# Patient Record
Sex: FEMALE | Race: WHITE | Employment: UNEMPLOYED | URBAN - METROPOLITAN AREA
[De-identification: names, ages, dates, MRNs, and addresses within clinical notes are randomized per-mention and may not be internally consistent; named-entity substitution may affect disease eponyms.]

---

## 2017-11-10 ENCOUNTER — TRANSFERRED RECORDS (OUTPATIENT)
Dept: HEALTH INFORMATION MANAGEMENT | Facility: CLINIC | Age: 42
End: 2017-11-10

## 2018-06-07 ENCOUNTER — HOSPITAL ENCOUNTER (INPATIENT)
Facility: HOSPITAL | Age: 43
LOS: 4 days | Discharge: NURSING, REHAB OR LONG-TERM CARE | End: 2018-06-11
Attending: FAMILY MEDICINE | Admitting: PSYCHIATRY & NEUROLOGY
Payer: MEDICAID

## 2018-06-07 DIAGNOSIS — F20.9 SCHIZOPHRENIA (H): ICD-10-CM

## 2018-06-07 DIAGNOSIS — F20.0 CHRONIC PARANOID SCHIZOPHRENIA (H): Primary | ICD-10-CM

## 2018-06-07 DIAGNOSIS — E03.9 HYPOTHYROIDISM, UNSPECIFIED TYPE: ICD-10-CM

## 2018-06-07 PROBLEM — F29 PSYCHOSIS (H): Status: ACTIVE | Noted: 2018-06-07

## 2018-06-07 LAB
ALBUMIN SERPL-MCNC: 3.4 G/DL (ref 3.4–5)
ALBUMIN UR-MCNC: NEGATIVE MG/DL
ALP SERPL-CCNC: 86 U/L (ref 40–150)
ALT SERPL W P-5'-P-CCNC: 29 U/L (ref 0–50)
AMPHETAMINES UR QL SCN: NEGATIVE
ANION GAP SERPL CALCULATED.3IONS-SCNC: 7 MMOL/L (ref 3–14)
APAP SERPL-MCNC: <2 MG/L (ref 10–20)
APPEARANCE UR: ABNORMAL
APTT PPP: 28 SEC (ref 24–37)
AST SERPL W P-5'-P-CCNC: 20 U/L (ref 0–45)
BACTERIA #/AREA URNS HPF: ABNORMAL /HPF
BARBITURATES UR QL: NEGATIVE
BASOPHILS # BLD AUTO: 0.1 10E9/L (ref 0–0.2)
BASOPHILS NFR BLD AUTO: 0.5 %
BENZODIAZ UR QL: NEGATIVE
BILIRUB SERPL-MCNC: 0.5 MG/DL (ref 0.2–1.3)
BILIRUB UR QL STRIP: NEGATIVE
BUN SERPL-MCNC: 11 MG/DL (ref 7–30)
CALCIUM SERPL-MCNC: 9.6 MG/DL (ref 8.5–10.1)
CANNABINOIDS UR QL SCN: NEGATIVE
CHLORIDE SERPL-SCNC: 103 MMOL/L (ref 94–109)
CO2 SERPL-SCNC: 27 MMOL/L (ref 20–32)
COCAINE UR QL: NEGATIVE
COLOR UR AUTO: ABNORMAL
CREAT SERPL-MCNC: 0.71 MG/DL (ref 0.52–1.04)
DIFFERENTIAL METHOD BLD: NORMAL
EOSINOPHIL # BLD AUTO: 0.2 10E9/L (ref 0–0.7)
EOSINOPHIL NFR BLD AUTO: 2.6 %
ERYTHROCYTE [DISTWIDTH] IN BLOOD BY AUTOMATED COUNT: 14.6 % (ref 10–15)
ETHANOL SERPL-MCNC: <0.01 G/DL
GFR SERPL CREATININE-BSD FRML MDRD: 90 ML/MIN/1.7M2
GLUCOSE SERPL-MCNC: 132 MG/DL (ref 70–99)
GLUCOSE UR STRIP-MCNC: NEGATIVE MG/DL
HCG UR QL: NEGATIVE
HCT VFR BLD AUTO: 41.1 % (ref 35–47)
HGB BLD-MCNC: 13.6 G/DL (ref 11.7–15.7)
HGB UR QL STRIP: NEGATIVE
IMM GRANULOCYTES # BLD: 0 10E9/L (ref 0–0.4)
IMM GRANULOCYTES NFR BLD: 0.4 %
INR PPP: 1.03 (ref 0.8–1.2)
KETONES UR STRIP-MCNC: NEGATIVE MG/DL
LEUKOCYTE ESTERASE UR QL STRIP: ABNORMAL
LYMPHOCYTES # BLD AUTO: 1.9 10E9/L (ref 0.8–5.3)
LYMPHOCYTES NFR BLD AUTO: 21 %
MCH RBC QN AUTO: 26.5 PG (ref 26.5–33)
MCHC RBC AUTO-ENTMCNC: 33.1 G/DL (ref 31.5–36.5)
MCV RBC AUTO: 80 FL (ref 78–100)
METHADONE UR QL SCN: NEGATIVE
MONOCYTES # BLD AUTO: 0.6 10E9/L (ref 0–1.3)
MONOCYTES NFR BLD AUTO: 6 %
MUCOUS THREADS #/AREA URNS LPF: PRESENT /LPF
NEUTROPHILS # BLD AUTO: 6.4 10E9/L (ref 1.6–8.3)
NEUTROPHILS NFR BLD AUTO: 69.5 %
NITRATE UR QL: NEGATIVE
NRBC # BLD AUTO: 0 10*3/UL
NRBC BLD AUTO-RTO: 0 /100
OPIATES UR QL SCN: NEGATIVE
PCP UR QL SCN: NEGATIVE
PH UR STRIP: 5.5 PH (ref 4.7–8)
PLATELET # BLD AUTO: 224 10E9/L (ref 150–450)
POTASSIUM SERPL-SCNC: 3.3 MMOL/L (ref 3.4–5.3)
PROT SERPL-MCNC: 7.3 G/DL (ref 6.8–8.8)
RBC # BLD AUTO: 5.14 10E12/L (ref 3.8–5.2)
RBC #/AREA URNS AUTO: 1 /HPF (ref 0–2)
SALICYLATES SERPL-MCNC: <2 MG/DL
SODIUM SERPL-SCNC: 137 MMOL/L (ref 133–144)
SOURCE: ABNORMAL
SP GR UR STRIP: 1.01 (ref 1–1.03)
SQUAMOUS #/AREA URNS AUTO: 5 /HPF (ref 0–1)
TSH SERPL DL<=0.005 MIU/L-ACNC: 6.58 MU/L (ref 0.4–4)
UROBILINOGEN UR STRIP-MCNC: NORMAL MG/DL (ref 0–2)
WBC # BLD AUTO: 9.3 10E9/L (ref 4–11)
WBC #/AREA URNS AUTO: 7 /HPF (ref 0–5)

## 2018-06-07 PROCEDURE — 36415 COLL VENOUS BLD VENIPUNCTURE: CPT | Performed by: FAMILY MEDICINE

## 2018-06-07 PROCEDURE — 80307 DRUG TEST PRSMV CHEM ANLYZR: CPT | Performed by: FAMILY MEDICINE

## 2018-06-07 PROCEDURE — 80329 ANALGESICS NON-OPIOID 1 OR 2: CPT | Performed by: FAMILY MEDICINE

## 2018-06-07 PROCEDURE — 99284 EMERGENCY DEPT VISIT MOD MDM: CPT | Mod: Z6 | Performed by: FAMILY MEDICINE

## 2018-06-07 PROCEDURE — 87086 URINE CULTURE/COLONY COUNT: CPT | Performed by: FAMILY MEDICINE

## 2018-06-07 PROCEDURE — 80053 COMPREHEN METABOLIC PANEL: CPT | Performed by: FAMILY MEDICINE

## 2018-06-07 PROCEDURE — 25000132 ZZH RX MED GY IP 250 OP 250 PS 637: Performed by: FAMILY MEDICINE

## 2018-06-07 PROCEDURE — 12400000 ZZH R&B MH

## 2018-06-07 PROCEDURE — 81025 URINE PREGNANCY TEST: CPT | Performed by: FAMILY MEDICINE

## 2018-06-07 PROCEDURE — 99223 1ST HOSP IP/OBS HIGH 75: CPT | Performed by: NURSE PRACTITIONER

## 2018-06-07 PROCEDURE — 84443 ASSAY THYROID STIM HORMONE: CPT | Performed by: FAMILY MEDICINE

## 2018-06-07 PROCEDURE — 80320 DRUG SCREEN QUANTALCOHOLS: CPT | Performed by: FAMILY MEDICINE

## 2018-06-07 PROCEDURE — 85730 THROMBOPLASTIN TIME PARTIAL: CPT | Performed by: FAMILY MEDICINE

## 2018-06-07 PROCEDURE — 25000132 ZZH RX MED GY IP 250 OP 250 PS 637: Performed by: NURSE PRACTITIONER

## 2018-06-07 PROCEDURE — 81001 URINALYSIS AUTO W/SCOPE: CPT | Performed by: FAMILY MEDICINE

## 2018-06-07 PROCEDURE — 99285 EMERGENCY DEPT VISIT HI MDM: CPT

## 2018-06-07 PROCEDURE — 85610 PROTHROMBIN TIME: CPT | Performed by: FAMILY MEDICINE

## 2018-06-07 PROCEDURE — 85025 COMPLETE CBC W/AUTO DIFF WBC: CPT | Performed by: FAMILY MEDICINE

## 2018-06-07 RX ORDER — OLANZAPINE 10 MG/1
10 TABLET ORAL ONCE
Status: COMPLETED | OUTPATIENT
Start: 2018-06-07 | End: 2018-06-07

## 2018-06-07 RX ORDER — RISPERIDONE 1 MG/1
1 TABLET ORAL AT BEDTIME
Status: DISCONTINUED | OUTPATIENT
Start: 2018-06-07 | End: 2018-06-08

## 2018-06-07 RX ORDER — OLANZAPINE 10 MG/1
TABLET ORAL
Status: DISCONTINUED
Start: 2018-06-07 | End: 2018-06-07 | Stop reason: HOSPADM

## 2018-06-07 RX ORDER — ALUMINA, MAGNESIA, AND SIMETHICONE 2400; 2400; 240 MG/30ML; MG/30ML; MG/30ML
30 SUSPENSION ORAL EVERY 4 HOURS PRN
Status: DISCONTINUED | OUTPATIENT
Start: 2018-06-07 | End: 2018-06-11 | Stop reason: HOSPADM

## 2018-06-07 RX ORDER — ACETAMINOPHEN 325 MG/1
650 TABLET ORAL EVERY 4 HOURS PRN
Status: DISCONTINUED | OUTPATIENT
Start: 2018-06-07 | End: 2018-06-11 | Stop reason: HOSPADM

## 2018-06-07 RX ORDER — BISACODYL 10 MG
10 SUPPOSITORY, RECTAL RECTAL DAILY PRN
Status: DISCONTINUED | OUTPATIENT
Start: 2018-06-07 | End: 2018-06-11 | Stop reason: HOSPADM

## 2018-06-07 RX ORDER — LEVOTHYROXINE SODIUM 25 UG/1
50 TABLET ORAL
Status: DISCONTINUED | OUTPATIENT
Start: 2018-06-07 | End: 2018-06-11 | Stop reason: HOSPADM

## 2018-06-07 RX ORDER — LEVOTHYROXINE SODIUM 50 UG/1
50 TABLET ORAL
Status: ON HOLD | COMMUNITY
Start: 2017-02-01 | End: 2018-06-11

## 2018-06-07 RX ORDER — HYDROXYZINE HYDROCHLORIDE 25 MG/1
25-50 TABLET, FILM COATED ORAL EVERY 4 HOURS PRN
Status: DISCONTINUED | OUTPATIENT
Start: 2018-06-07 | End: 2018-06-11 | Stop reason: HOSPADM

## 2018-06-07 RX ADMIN — LEVOTHYROXINE SODIUM 50 MCG: 25 TABLET ORAL at 14:31

## 2018-06-07 RX ADMIN — RISPERIDONE 1 MG: 1 TABLET ORAL at 20:34

## 2018-06-07 RX ADMIN — OLANZAPINE 10 MG: 10 TABLET, FILM COATED ORAL at 02:58

## 2018-06-07 ASSESSMENT — ACTIVITIES OF DAILY LIVING (ADL)
DRESS: SCRUBS (BEHAVIORAL HEALTH);INDEPENDENT
ORAL_HYGIENE: INDEPENDENT
RETIRED_COMMUNICATION: 0-->UNDERSTANDS/COMMUNICATES WITHOUT DIFFICULTY
DRESS: 0-->INDEPENDENT
FALL_HISTORY_WITHIN_LAST_SIX_MONTHS: NO
LAUNDRY: UNABLE TO COMPLETE
TOILETING: 0-->INDEPENDENT
RETIRED_EATING: 0-->INDEPENDENT
AMBULATION: 0-->INDEPENDENT
ORAL_HYGIENE: INDEPENDENT
LAUNDRY: UNABLE TO COMPLETE
BATHING: 0-->INDEPENDENT
DRESS: SCRUBS (BEHAVIORAL HEALTH)
GROOMING: INDEPENDENT
GROOMING: INDEPENDENT
SWALLOWING: 0-->SWALLOWS FOODS/LIQUIDS WITHOUT DIFFICULTY
TRANSFERRING: 0-->INDEPENDENT
COGNITION: 0 - NO COGNITION ISSUES REPORTED

## 2018-06-07 NOTE — ED PROVIDER NOTES
History     Chief Complaint   Patient presents with     Mental Health Problem     HPI  Kaylee Hester is a 42 year old female who presents to the ER by PD who states that she called 911 for sunburn however PD brought her in for a mental health check . Patient denies of self harm or hurting others. Denies drug or alcohol use.  . States that she does not know whats going outside and it effects the halo around her head  And she doesn't understand what goes on outside the halo around her head . Last took her risperdal 1 year ago . Never made an appointment . Patient does hear voices of people chatting. Chatting does not make sense. Voices do not tell her to do anything in particular     Problem List:    There are no active problems to display for this patient.       Past Medical History:    No past medical history on file.    Past Surgical History:    No past surgical history on file.    Family History:    No family history on file.    Social History:  Marital Status:   [2]  Social History   Substance Use Topics     Smoking status: Not on file     Smokeless tobacco: Not on file     Alcohol use Not on file        Medications:      levothyroxine (SYNTHROID/LEVOTHROID) 50 MCG tablet   RisperiDONE (RISPERDAL PO)         Review of Systems   All other systems reviewed and are negative.      Physical Exam   BP: (!) 147/102  Pulse: 101  Temp: 96.7  F (35.9  C)  Resp: 16  SpO2: 97 %      Physical Exam   Constitutional: She is oriented to person, place, and time. She appears well-developed and well-nourished. No distress.   HENT:   Head: Normocephalic and atraumatic.   Eyes: Pupils are equal, round, and reactive to light.   Neck: Normal range of motion. Neck supple.   Pulmonary/Chest: Effort normal and breath sounds normal.   Abdominal: Soft.   Musculoskeletal: Normal range of motion.   Neurological: She is alert and oriented to person, place, and time.   Skin: Skin is warm and dry. She is not diaphoretic.    Psychiatric: She has a normal mood and affect.   Nursing note and vitals reviewed.      ED Course     ED Course     Procedures          Patient arrived to ER with PD for mental health eval after [place] . Patient has schizophrenia and has been off of her medications for the last 1 year. Patient is not suicidal or homicidal but having psychosis. DEC assessment requested . After assessment DEC felt patient appropriate for inpatient admission to reinitiate medication management . Patient admitted to 5th floor following medical clearance in the ER       Results for orders placed or performed during the hospital encounter of 06/07/18   Drug Screen Urine (Range)   Result Value Ref Range    Amphetamine Qual Urine Negative NEG^Negative    Barbiturates Qual Urine Negative NEG^Negative    Benzodiazepine Qual Urine Negative NEG^Negative    Cannabinoids Qual Urine Negative NEG^Negative    Cocaine Qual Urine Negative NEG^Negative    Opiates Qualitative Urine Negative NEG^Negative    Methadone Qual Urine Negative NEG^Negative    PCP Qual Urine Negative NEG^Negative   UA with Microscopic reflex to Culture   Result Value Ref Range    Color Urine Light Yellow     Appearance Urine Slightly Cloudy     Glucose Urine Negative NEG^Negative mg/dL    Bilirubin Urine Negative NEG^Negative    Ketones Urine Negative NEG^Negative mg/dL    Specific Gravity Urine 1.006 1.003 - 1.035    Blood Urine Negative NEG^Negative    pH Urine 5.5 4.7 - 8.0 pH    Protein Albumin Urine Negative NEG^Negative mg/dL    Urobilinogen mg/dL Normal 0.0 - 2.0 mg/dL    Nitrite Urine Negative NEG^Negative    Leukocyte Esterase Urine Moderate (A) NEG^Negative    Source Midstream Urine     WBC Urine 7 (H) 0 - 5 /HPF    RBC Urine 1 0 - 2 /HPF    Bacteria Urine Few (A) NEG^Negative /HPF    Squamous Epithelial /HPF Urine 5 (H) 0 - 1 /HPF    Mucous Urine Present (A) NEG^Negative /LPF   HCG qualitative urine   Result  Value Ref Range    HCG Qual Urine Negative NEG^Negative   Acetaminophen level   Result Value Ref Range    Acetaminophen Level <2 (L) 10 - 20 mg/L   Alcohol ethyl   Result Value Ref Range    Ethanol g/dL <0.01 0.01 g/dL   CBC with platelets differential   Result Value Ref Range    WBC 9.3 4.0 - 11.0 10e9/L    RBC Count 5.14 3.8 - 5.2 10e12/L    Hemoglobin 13.6 11.7 - 15.7 g/dL    Hematocrit 41.1 35.0 - 47.0 %    MCV 80 78 - 100 fl    MCH 26.5 26.5 - 33.0 pg    MCHC 33.1 31.5 - 36.5 g/dL    RDW 14.6 10.0 - 15.0 %    Platelet Count 224 150 - 450 10e9/L    Diff Method Automated Method     % Neutrophils 69.5 %    % Lymphocytes 21.0 %    % Monocytes 6.0 %    % Eosinophils 2.6 %    % Basophils 0.5 %    % Immature Granulocytes 0.4 %    Nucleated RBCs 0 0 /100    Absolute Neutrophil 6.4 1.6 - 8.3 10e9/L    Absolute Lymphocytes 1.9 0.8 - 5.3 10e9/L    Absolute Monocytes 0.6 0.0 - 1.3 10e9/L    Absolute Eosinophils 0.2 0.0 - 0.7 10e9/L    Absolute Basophils 0.1 0.0 - 0.2 10e9/L    Abs Immature Granulocytes 0.0 0 - 0.4 10e9/L    Absolute Nucleated RBC 0.0    Comprehensive metabolic panel   Result Value Ref Range    Sodium 137 133 - 144 mmol/L    Potassium 3.3 (L) 3.4 - 5.3 mmol/L    Chloride 103 94 - 109 mmol/L    Carbon Dioxide 27 20 - 32 mmol/L    Anion Gap 7 3 - 14 mmol/L    Glucose 132 (H) 70 - 99 mg/dL    Urea Nitrogen 11 7 - 30 mg/dL    Creatinine 0.71 0.52 - 1.04 mg/dL    GFR Estimate 90 >60 mL/min/1.7m2    GFR Estimate If Black >90 >60 mL/min/1.7m2    Calcium 9.6 8.5 - 10.1 mg/dL    Bilirubin Total 0.5 0.2 - 1.3 mg/dL    Albumin 3.4 3.4 - 5.0 g/dL    Protein Total 7.3 6.8 - 8.8 g/dL    Alkaline Phosphatase 86 40 - 150 U/L    ALT 29 0 - 50 U/L    AST 20 0 - 45 U/L   INR   Result Value Ref Range    INR 1.03 0.80 - 1.20   Partial thromboplastin time   Result Value Ref Range    PTT 28 24.00 - 37.00 sec   Salicylate level   Result Value Ref Range    Salicylate Level <2 mg/dL   TSH   Result Value Ref Range    TSH 6.58 (H)  0.40 - 4.00 mU/L         No results found for this or any previous visit (from the past 24 hour(s)).    Medications - No data to display    Assessments & Plan (with Medical Decision Making)     I have reviewed the nursing notes.    I have reviewed the findings, diagnosis, plan and need for follow up with the patient.      New Prescriptions    No medications on file       Final diagnoses:   Schizophrenia (H)       6/7/2018   [facility]     Paige Toth MD  06/07/18 0801

## 2018-06-07 NOTE — PLAN OF CARE
Problem: Patient Care Overview  Goal: Team Discussion  Team Plan:   BEHAVIORAL TEAM DISCUSSION    Participants:Amaya Puri NP, Juliet Zayas NP,  Radha Roman NP, Anna Iglesias [facility], Zoraida Urbina [facility], Shirley Rios RN, Temi Ta OT, Mallory MILLS Intern, Radha OT, Mike Rush RN   Progress: None- New admit  Continued Stay Criteria/Rationale: Need to assess and restart medications  Medical/Physical: unknown  Precautions:   Behavioral Orders   Procedures     Code 1 - Restrict to Unit     Routine Programming     As clinically indicated     Status 15     Every 15 minutes.     Plan: Assess  Rationale for change in precautions or plan: n/a

## 2018-06-07 NOTE — ED TRIAGE NOTES
Pt presents with c/o being brought in via [place] PD with c/o being homeless, is living in a tent at the campground in [place] [facility] [place]. Pt denies HI or SI, pt states she has not taken meds in over a year.

## 2018-06-07 NOTE — PLAN OF CARE
Problem: Thought Process Alteration (Adult)  Goal: Improved Thought Process  Patient will have a reality based conversations.  Patient will report a decrease in hallucinations.  Patient will follow treatment teams recommendations.   Outcome: No Change  Pt arrives on unit from the [place] ED. Pt is ambulates without difficulty. She denies SI, HI, depression, anxiety, and hallucinations. It was reported that pt called 911 on herself due to sunburn, pt is then brought into the ED for Mental Health assessment. Pt states she is here due to exposure. Pt does report she does hear voices but not at this time. She states she is tired. Pt is falling asleep during assessment. Pt is compliant with skin assessment. Nurse reviews bathroom policy and patient requests to lay down. Nurse will complete nursing assessment when patient wakes.     ADMISSION NOTE    Reason for admission: Hallucinations  Safety concerns: patient has auditory hallucinations  Risk for or history of violence no known hx of violenc   Full skin assessment: Heels are dry and cracking, no other areas of concern.     Patient arrived on unit from [facility] ED accompanied by Security and ED staff on 6/7/2018  7:27 AM.   Status on arrival: calm, cooperative, walks independently  /80  Pulse 83  Temp 97  F (36.1  C) (Tympanic)  Resp 16  Wt 129.2 kg (284 lb 14.4 oz)  SpO2 98%  Patient given tour of unit and Welcome to  unit papers given to patient, wanding completed, belongings inventoried, and admission assessment completed.   Patient's legal status on arrival is Voluntary. Appropriate legal rights discussed with and copy given to patient. Patient Bill of Rights discussed with and copy given to patient.   Patient denies SI, HI, and thoughts of self harm and contracts for safety while on unit.      Rebecca Mayes  6/7/2018  9:14 AM        Nurse requests patient to sign a release for [facility]. Pt declines. Nurse does contact [facility] and Elena  [place] Nurse updates patient that she was last seen in Nov. 2017 when she was prescribed Risperdal 4 mg BID and Hydroxyzine 25mg TID PRN for anxiety, she was provided 6 months worth of medications. She did schedule an appointment on May 2nd but then called back and canceled it stating she had found care elsewhere. She then called back on May 30th and scheduled an appointment again stating she needed Xanax to relax. She was previously prescribed Prozac, Olanzapine, and Seroquel XL. Also she does have 2 sons she talks with that live in [place] [facility] [place]. Pt.s diagnosis on file are Schizoaffective depressed type and generalized anxiety.     Pt is med compliant. Pt continues to sleep, she will not answer questions asked of her.

## 2018-06-07 NOTE — PLAN OF CARE
Problem: Patient Care Overview  Goal: Discharge Needs Assessment  Attempted to complete assessment on pt along with provider.  Pt stated she was here to get assessed for her sun burn.  She fell asleep repeatedly and then became agitated so assessments were not completed.  Writer called [facility] to see if pt had any services set up but they reported she did not.  Writer tried to get pt sign a CHARLES for her family or [facility] but pt fell asleep again.   intern called [facility] and learned pt already has an appointment set up there for medication management on June 13th with Elena Mustafa and with the therapist tSef Wisdom on June 11th.      Pt does not currently have any medical insurance: pt financial came but will come back tomorrow to help her get set up with insurance.

## 2018-06-07 NOTE — IP AVS SNAPSHOT
[facility]    [address] [place] [id number]    Phone:  [phone]    Fax:  [phone]                                       After Visit Summary   6/7/2018    Kaylee Hester    MRN: [id number]           After Visit Summary Signature Page     I have received my discharge instructions, and my questions have been answered. I have discussed any challenges I see with this plan with the nurse or doctor.    ..........................................................................................................................................  Patient/Patient Representative Signature      ..........................................................................................................................................  Patient Representative Print Name and Relationship to Patient    ..................................................               ................................................  Date                                            Time    ..........................................................................................................................................  Reviewed by Signature/Title    ...................................................              ..............................................  Date                                                            Time

## 2018-06-07 NOTE — H&P
"Psychiatric Eval/H&P  Patient Name: Kaylee Hester   YOB: 1975  Age: 42 year old  [id number]    Primary Physician: No Ref-Primary, Physician   Completed By: Radha Roman NP     CC: \"I'm here because of overexposure\"    Patient is a poor historian and unable to provide much relevant information. Information obtained from brief patient visit and review of available records.    HPI   Kaylee Hester is a 42 year old   female who was brought to the ED by police for mental health assessment after calling 911 in the middle of the night for sunburn. She has been staying in a tent in [facility]. She reportedly receives food from the [employer]. In the ED she reported that she does not know what is going on outside and it affects the halo around her head. History of taking Risperdal, has been off of medications for about a year.     I attempted to meet with Kaylee today in her room, though she initially keeps her back to me and repeatedly falls asleep during questioning. She is unable to provide any meaningful information besides that she came to the hospital for \"overexposure\" and is on the mental health floor \"for monitoring\". Discussed her use of Risperdal and she states that it \"doesn't help\", though then states \"I think I need some Xanax.\" When asked if this was being prescribed to her she reports \"no naren no one will give it to me\". She then starts to become upset and sticks her middle finger up at me and rolls over mumbling something about calling her when some male leaves the unit. When asked to repeat what she had just said she then ignores me.     ED records indicate that she admitted to hearing \"voices of people chatting\", though the chatting did not make any sense. She denied any thoughts of suicidal or homicidal ideation. TSH was noted to be elevated upon admission, has not been taking Synthroid or other medications reportedly. It appears she was most recently " prescribed Risperdal at [facility] in November. Will attempt to restart this today to see if symptoms improve.         [facility]     Past Psychiatric History:   It is unclear whether she has been hospitalized for mental health in the past. Records do show a historical diagnosis of schizophrenia and schizoaffective disorder. She did report seeing Elena Mustafa CNP at [facility] for medications. Past medication trials include Risperdal, Zyprexa, Prozac, Hydroxyzine, and Seroquel as well as likely others.     Social History:   She reportedly is living in a tent somewhere in [place] with her dog. She got  in 2017 after 15 years of marriage. Has 2 sons. Per patient, she had been living in [place] with her  and kids for about 15 years. Her ex- and 2 sons remain in [place] and she communicates with them via internet. Unclear when she moved back to [place], though records show that she grew up in the area and mother currently lives in [place].       Chemical Use History:   She denied any drug use in the ED. Utox and ETOH were both negative.     Family Psychiatric History:   Unknown at this time.        Medical History and ROS  No current outpatient prescriptions on file.     No Known Allergies     No past medical history on file.     No past surgical history on file.      Physical Exam    Constitutional: oriented to person, place, and time.    HENT:   Head: Normocephalic and atraumatic.   Right Ear: External ear normal.   Left Ear: External ear normal.   Nose: Nose normal.   Mouth/Throat: Oropharynx is clear and moist.    Eyes: Conjunctivae and EOM are normal.  Neck: Normal range of motion.  Cardiovascular: Normal rate, regular rhythm  Pulmonary/Chest: Effort normal and breath sounds normal.   Abdominal: Soft. Bowel sounds are normal.    Skin: Dry, intact, no open areas, rashes, moles of concern    Review of Systems:  Unable to complete assessment as she declines to  participate.      MSE/PSYCH  PSYCHIATRIC EXAM  /80  Pulse 83  Temp 97  F (36.1  C) (Tympanic)  Resp 16  Wt 129.2 kg (284 lb 14.4 oz)  SpO2 98%  -Appearance/Behavior:  No apparent distress and Appears stated age, has shaved head    -Attitude:guarded, irritable  -Motor: normal or unremarkable.  -Gait: not observed as patient is in bed  -Abnormal involuntary movements: none noted.  -Mood: irritable and labile.  -Affect: Labile.  -Speech: regular rate and rhythm                -Thought process/associations: Tangential.  -Thought content: difficult to assess as she offers little in conversation  -Perceptual disturbances: Occasional hallucinations., in ED reported hearing voices             -Suicidal/Homicidal Ideation: denies any suicidal or homicidal ideation  -Judgment: Limited.  -Insight: Limited.  *Orientation: person, unable to assess further as she refuses to answer questions  *Memory: questionable  *Attention: limited, easily distracted  *Language: fluent, no aphasias, able to repeat phrases and name objects. Vocab intact.  *Fund of information: appropriate for education  *Cognitive functioning estimate: 0 - independent.     Labs:   Results for orders placed or performed during the hospital encounter of 06/07/18   Drug Screen Urine (Range)   Result Value Ref Range    Amphetamine Qual Urine Negative NEG^Negative    Barbiturates Qual Urine Negative NEG^Negative    Benzodiazepine Qual Urine Negative NEG^Negative    Cannabinoids Qual Urine Negative NEG^Negative    Cocaine Qual Urine Negative NEG^Negative    Opiates Qualitative Urine Negative NEG^Negative    Methadone Qual Urine Negative NEG^Negative    PCP Qual Urine Negative NEG^Negative   UA with Microscopic reflex to Culture   Result Value Ref Range    Color Urine Light Yellow     Appearance Urine Slightly Cloudy     Glucose Urine Negative NEG^Negative mg/dL    Bilirubin Urine Negative NEG^Negative    Ketones Urine Negative NEG^Negative mg/dL    Specific  Gravity Urine 1.006 1.003 - 1.035    Blood Urine Negative NEG^Negative    pH Urine 5.5 4.7 - 8.0 pH    Protein Albumin Urine Negative NEG^Negative mg/dL    Urobilinogen mg/dL Normal 0.0 - 2.0 mg/dL    Nitrite Urine Negative NEG^Negative    Leukocyte Esterase Urine Moderate (A) NEG^Negative    Source Midstream Urine     WBC Urine 7 (H) 0 - 5 /HPF    RBC Urine 1 0 - 2 /HPF    Bacteria Urine Few (A) NEG^Negative /HPF    Squamous Epithelial /HPF Urine 5 (H) 0 - 1 /HPF    Mucous Urine Present (A) NEG^Negative /LPF   HCG qualitative urine   Result Value Ref Range    HCG Qual Urine Negative NEG^Negative   Acetaminophen level   Result Value Ref Range    Acetaminophen Level <2 (L) 10 - 20 mg/L   Alcohol ethyl   Result Value Ref Range    Ethanol g/dL <0.01 0.01 g/dL   CBC with platelets differential   Result Value Ref Range    WBC 9.3 4.0 - 11.0 10e9/L    RBC Count 5.14 3.8 - 5.2 10e12/L    Hemoglobin 13.6 11.7 - 15.7 g/dL    Hematocrit 41.1 35.0 - 47.0 %    MCV 80 78 - 100 fl    MCH 26.5 26.5 - 33.0 pg    MCHC 33.1 31.5 - 36.5 g/dL    RDW 14.6 10.0 - 15.0 %    Platelet Count 224 150 - 450 10e9/L    Diff Method Automated Method     % Neutrophils 69.5 %    % Lymphocytes 21.0 %    % Monocytes 6.0 %    % Eosinophils 2.6 %    % Basophils 0.5 %    % Immature Granulocytes 0.4 %    Nucleated RBCs 0 0 /100    Absolute Neutrophil 6.4 1.6 - 8.3 10e9/L    Absolute Lymphocytes 1.9 0.8 - 5.3 10e9/L    Absolute Monocytes 0.6 0.0 - 1.3 10e9/L    Absolute Eosinophils 0.2 0.0 - 0.7 10e9/L    Absolute Basophils 0.1 0.0 - 0.2 10e9/L    Abs Immature Granulocytes 0.0 0 - 0.4 10e9/L    Absolute Nucleated RBC 0.0    Comprehensive metabolic panel   Result Value Ref Range    Sodium 137 133 - 144 mmol/L    Potassium 3.3 (L) 3.4 - 5.3 mmol/L    Chloride 103 94 - 109 mmol/L    Carbon Dioxide 27 20 - 32 mmol/L    Anion Gap 7 3 - 14 mmol/L    Glucose 132 (H) 70 - 99 mg/dL    Urea Nitrogen 11 7 - 30 mg/dL    Creatinine 0.71 0.52 - 1.04 mg/dL    GFR  "Estimate 90 >60 mL/min/1.7m2    GFR Estimate If Black >90 >60 mL/min/1.7m2    Calcium 9.6 8.5 - 10.1 mg/dL    Bilirubin Total 0.5 0.2 - 1.3 mg/dL    Albumin 3.4 3.4 - 5.0 g/dL    Protein Total 7.3 6.8 - 8.8 g/dL    Alkaline Phosphatase 86 40 - 150 U/L    ALT 29 0 - 50 U/L    AST 20 0 - 45 U/L   INR   Result Value Ref Range    INR 1.03 0.80 - 1.20   Partial thromboplastin time   Result Value Ref Range    PTT 28 24.00 - 37.00 sec   Salicylate level   Result Value Ref Range    Salicylate Level <2 mg/dL   TSH   Result Value Ref Range    TSH 6.58 (H) 0.40 - 4.00 mU/L   Urine Culture Aerobic Bacterial   Result Value Ref Range    Specimen Description Midstream Urine     Culture Micro Culture in progress         Assessment/Impression: This is a 42 year old yo female with a reported history of schizoaffective disorder. She reportedly called 911 last night to report that she had sunburn and was subsequently brought to the ED by police for mental health evaluation. Has been living in a tent in [place]. Has reportedly been off all medications for about a year. I am unable to get any relevant information from her today, she gets upset with questioning and then sticks up her middle finger and rolls over in bed. It does appear that she was most recently on Risperdal 4 mg BID. Will attempt to restart this tonight. She does request Xanax during our brief interaction and reported \"no one will give it to me\". She is currently voluntary though may need to consider petition for commitment if she refuses medications and symptoms don't improve as she is seemingly unable to care for herself at this point or make sound decisions.     Educated regarding medication indications, risks, benefits, side effects, contraindications and possible interactions. Verbally expressed understanding.     DX:  Schizoaffective disorder-depressed type (by history)     Plan:  Admit to Unit: [address] [place]  Attending: Radha Roman CNP  Patient is: Voluntary  Other " routine labs were notable for elevated TSH 6.58  Monitor for target symptoms: improved mood, compliance with care  Provide a safe environment and therapeutic milieu.   Restart Risperdal and Synthroid- has taken Risperdal in the past  May need to consider petition for commitment if refusing medications and no notable improvement in symptoms    Anticipated length of stay: 3-5 days       RITA Araya, CNP

## 2018-06-07 NOTE — IP AVS SNAPSHOT
MRN:[id number]                      After Visit Summary   6/7/2018    Kaylee Hester    MRN: [id number]           Thank you!     Thank you for choosing [facility] for your care. Our goal is always to provide you with excellent care. Hearing back from our patients is one way we can continue to improve our services. Please take a few minutes to complete the written survey that you may receive in the mail after you visit with us. Thank you!        Patient Information     Date Of Birth          1975        Designated Caregiver       Most Recent Value    Caregiver    Will someone help with your care after discharge? no      About your hospital stay     You were admitted on:  June 7, 2018 You last received care in the:  [facility]    You were discharged on:  June 11, 2018       Who to Call     For medical emergencies, please call 911.  For non-urgent questions about your medical care, please call your primary care provider or clinic, None          Attending Provider     Provider Specialty    Paige Toth MD [facility]    Con Redding MD Psychiatry    Petty, Amaya De Leon NP Psychiatry       Primary Care Provider Fax #    Physician No Ref-Primary [phone]      Further instructions from your care team       Behavioral Discharge Planning and Instructions    Summary: Kaylee was admitted to [address] [place] [address] for psychosis    Main Diagnosis:  Schizoaffective disorder, depressed type    Major Treatments, Procedures and Findings: Stabilize with medications, connect with community programs.    Symptoms to Report: feeling more aggressive, increased confusion, losing more sleep, mood getting worse or thoughts of suicide    Lifestyle Adjustment: Take all medications as prescribed, meet with doctor/ medication provider, out patient therapist, , and [facility] worker as scheduled. Abstain from alcohol or any unprescribed drugs.      Psychiatry Follow-up:     [facility]: Elena Mustafa: Wed. June 13th @ 10:40 am  Therapy: Stef Artis: call to reschedule  [address] [place] [address]  [phone]  [phone]    [facility] :  Referral sent for Case Management and [facility] 6/11/18  [facility]  [address] [place] [address]  Phone [phone]   Fax- [phone]    [place], [place]   Phone: [phone]    Resources:   Crisis Intervention: 193.739.1019 or 143-125-2008 (TTY: 408.984.7682).  Call anytime for help.  National Wing on Mental Illness (www.[place].tanya.org): 651.115.1046 or 239-222-9011.  Alcoholics Anonymous (www.alcoholics-anonymous.org): Check your phone book for your local chapter.  Suicide Awareness Voices of Education (SAVE) (www.save.org): 116-633-MNED (2593)  National Suicide Prevention Line (www.mentalhealthmn.org): 044-932-RXUW (2519)  Mental Health Consumer/Survivor Network of [place] (www.mhcsn.net): 520.750.5361 or 104-184-7716  Mental Health Association of [place] (www.mentalhealth.org): 163.154.8567 or 708-493-5570    General Medication Instructions:   See your medication sheet(s) for instructions.   Take all medicines as directed.  Make no changes unless your doctor suggests them.   Go to all your doctor visits.  Be sure to have all your required lab tests. This way, your medicines can be refilled on time.  Do not use any drugs not prescribed by your doctor.  Avoid alcohol.    [facility] Area:  [facility], Crisis stabilization [facility]- [phone]  [facility] Crisis Line: 1-606.691.8460  Advocates For Family Peace: 930-2477  Sexual Assault Program [facility]: 534.417.3082 or 1-875.168.7926  [place] [facility]: 9-282-330-8208 Ext: 279       Calls answered Mon-Fri-8:00 am--4:30 pm    [place]:  Advocates for Family Peace: 1-082-269-8256  [facility] first call for help: 1-264.932.4402  [facility] Crisis Center:  (478) [phone]      [place] Area:  Warm Line:  "5-659-250-9088       Calls answered Tuesday--Saturday 4:00 pm--10:00 pm  [facility] Crisis Line - [phone]  [facility] 254-861-3063    [place] Statewide:  [place] Crisis and Referral Services: 3-925-536-1745  National Suicide Prevention Lifeline: 8-025-993-TALK (7638)   - efm9xbgw- Text \"Life\" to 82641  First Call for Help:   [facility] Helpline- 3-222-RXHX-HELP          Pending Results     No orders found from 2018 to 2018.            Statement of Approval     Ordered          18 1227  I have reviewed and agree with all the recommendations and orders detailed in this document.  EFFECTIVE NOW     Approved and electronically signed by:  Radha Roman NP             Admission Information     Date & Time Provider Department Dept. Phone    2018 Amaya Puri NP [facility] [phone]      Your Vitals Were     Blood Pressure Pulse Temperature Respirations Weight Pulse Oximetry    147/68 84 98  F (36.7  C) (Tympanic) 14 129.9 kg (286 lb 6.4 oz) 97%      MyChart Information     [facility] lets you send messages to your doctor, view your test results, renew your prescriptions, schedule appointments and more. To sign up, go to [email or web address] . Click on \"Log in\" on the left side of the screen, which will take you to the Welcome page. Then click on \"Sign up Now\" on the right side of the page.     You will be asked to enter the access code listed below, as well as some personal information. Please follow the directions to create your username and password.     Your access code is: [email or web address]  Expires: 2018 12:54 PM     Your access code will  in 90 days. If you need help or a new code, please call your [facility] clinic or [phone].        Care EveryWhere ID     This is your Care EveryWhere ID. This could be used by other organizations to access your [facility] medical records  [id number]        Equal Access to Services     ANMOL SHAIKH AH: Triny ybarra " cj Riojas, waakuada luanthonyadaha, qaybta kaelielda dre, tyler maikolin hayaakarel beforrest jessicavinicius lareynakarel miriam. So [facility] [phone].    ATENCIÓN: Si vanessa hager, tiene a johnson disposición servicios gratuitos de asistencia lingüística. Yamil al [phone].    We comply with applicable federal civil rights laws and [place] laws. We do not discriminate on the basis of race, color, national origin, age, disability, sex, sexual orientation, or gender identity.               Review of your medicines      START taking        Dose / Directions    emollient cream        Apply topically every 6 hours as needed for other (dry skin)   Refills:  0         CONTINUE these medicines which may have CHANGED, or have new prescriptions. If we are uncertain of the size of tablets/capsules you have at home, strength may be listed as something that might have changed.        Dose / Directions    levothyroxine 50 MCG tablet   Commonly known as:  SYNTHROID/LEVOTHROID   This may have changed:  when to take this   Used for:  Hypothyroidism, unspecified type        Dose:  50 mcg   Take 1 tablet (50 mcg) by mouth daily   Quantity:  30 tablet   Refills:  0       * risperiDONE 2 MG tablet   Commonly known as:  risperDAL   This may have changed:    - medication strength  - how much to take  - when to take this   Used for:  Chronic paranoid schizophrenia (H)        Dose:  2 mg   Take 1 tablet (2 mg) by mouth At Bedtime   Quantity:  30 tablet   Refills:  0       * risperiDONE 0.5 MG tablet   Commonly known as:  risperDAL   This may have changed:  You were already taking a medication with the same name, and this prescription was added. Make sure you understand how and when to take each.   Used for:  Chronic paranoid schizophrenia (H)        Dose:  0.5 mg   Start taking on:  6/12/2018   Take 1 tablet (0.5 mg) by mouth daily in the morning   Quantity:  30 tablet   Refills:  0       * Notice:  This list has 2 medication(s) that are the same as other  medications prescribed for you. Read the directions carefully, and ask your doctor or other care provider to review them with you.         Where to get your medicines      These medications were sent to [facility] - [place], [place] - [address]  [address] [address]     Phone:  [phone]     levothyroxine 50 MCG tablet    risperiDONE 0.5 MG tablet    risperiDONE 2 MG tablet                Protect others around you: Learn how to safely use, store and throw away your medicines at www.disposemymeds.org.             Medication List: This is a list of all your medications and when to take them. Check marks below indicate your daily home schedule. Keep this list as a reference.      Medications           Morning Afternoon Evening Bedtime As Needed    emollient cream   Apply topically every 6 hours as needed for other (dry skin)   Last time this was given:  6/10/2018 12:38 PM                                levothyroxine 50 MCG tablet   Commonly known as:  SYNTHROID/LEVOTHROID   Take 1 tablet (50 mcg) by mouth daily   Last time this was given:  50 mcg on 6/11/2018  5:48 AM                                * risperiDONE 2 MG tablet   Commonly known as:  risperDAL   Take 1 tablet (2 mg) by mouth At Bedtime   Last time this was given:  0.5 mg on 6/11/2018  8:50 AM                                * risperiDONE 0.5 MG tablet   Commonly known as:  risperDAL   Take 1 tablet (0.5 mg) by mouth daily in the morning   Start taking on:  6/12/2018   Last time this was given:  0.5 mg on 6/11/2018  8:50 AM                                * Notice:  This list has 2 medication(s) that are the same as other medications prescribed for you. Read the directions carefully, and ask your doctor or other care provider to review them with you.

## 2018-06-07 NOTE — PLAN OF CARE
Face to face end of shift report received from Rebecca ANTUNEZ RN. Rounding completed. Patient observed sleeping in bedroom.     Kenyatta Mendoza  6/7/2018  3:40 PM

## 2018-06-07 NOTE — PROGRESS NOTES
06/07/18 0854   Patient Belongings   Did you bring any home meds/supplements to the hospital?  No   Patient Belongings cell phone/electronics;clothing;shoes;tote   Disposition of Belongings Locker  (belongings sent to cubby in patient belongings room)   Belongings Search Yes   Clothing Search Yes   Second Staff Erma   General Info Comment blk flip flops, brown hooded sweatshit, blue megan shirt, blk tank top (feeling Tropical), orange bag, blue carring case, ear buds, white phone , blk phone , I phone 6 box with earbuds in it, manilla envelope with paper work (wet), tall glass vase, roll on deoderant, neutrogena small conditioner, neutrogena bar soap    List items sent to safe: 2 cell phones, 1 blk I Phone 6 with no cracks, 1 blk cell with cracked screen, [place], 1 book of [place] transit tickets, direct express debit mastercard, [place] [employer] card, several mics. Cards, 1 blue razor sent to security  All other belongings put in assigned cubby in belongings room.     I have reviewed my belongings list on admission and verify that it is correct.     Patient signature_______________________________    Second staff witness (if patient unable to sign) ______________________________       I have received all my belongings at discharge.    Patient signature________________________________    Laura   6/7/2018  9:01 AM

## 2018-06-07 NOTE — ED NOTES
TC from [facility] Intake, with update on pt admission status.  Stated has tried x 4 to contact Amaya Puri, on call provider for [facility] with no response.  He will continue to try.  Kristie, Supervisor notified.  Dr. Martinez updated.

## 2018-06-08 LAB
BACTERIA SPEC CULT: NORMAL
SPECIMEN SOURCE: NORMAL

## 2018-06-08 PROCEDURE — 12400000 ZZH R&B MH

## 2018-06-08 PROCEDURE — 25000132 ZZH RX MED GY IP 250 OP 250 PS 637: Performed by: NURSE PRACTITIONER

## 2018-06-08 PROCEDURE — 99232 SBSQ HOSP IP/OBS MODERATE 35: CPT | Performed by: NURSE PRACTITIONER

## 2018-06-08 RX ORDER — HALOPERIDOL 10 MG/1
10 TABLET ORAL EVERY 6 HOURS PRN
Status: DISCONTINUED | OUTPATIENT
Start: 2018-06-08 | End: 2018-06-11 | Stop reason: HOSPADM

## 2018-06-08 RX ORDER — HALOPERIDOL 5 MG/ML
10 INJECTION INTRAMUSCULAR EVERY 6 HOURS PRN
Status: DISCONTINUED | OUTPATIENT
Start: 2018-06-08 | End: 2018-06-11 | Stop reason: HOSPADM

## 2018-06-08 RX ORDER — RISPERIDONE 1 MG/1
2 TABLET ORAL AT BEDTIME
Status: DISCONTINUED | OUTPATIENT
Start: 2018-06-08 | End: 2018-06-11 | Stop reason: HOSPADM

## 2018-06-08 RX ADMIN — HYDROXYZINE HYDROCHLORIDE 50 MG: 25 TABLET ORAL at 08:16

## 2018-06-08 RX ADMIN — HALOPERIDOL 10 MG: 10 TABLET ORAL at 08:29

## 2018-06-08 RX ADMIN — LEVOTHYROXINE SODIUM 50 MCG: 25 TABLET ORAL at 06:20

## 2018-06-08 RX ADMIN — RISPERIDONE 2 MG: 1 TABLET ORAL at 20:07

## 2018-06-08 ASSESSMENT — ACTIVITIES OF DAILY LIVING (ADL)
GROOMING: INDEPENDENT
ORAL_HYGIENE: INDEPENDENT
GROOMING: INDEPENDENT
DRESS: SCRUBS (BEHAVIORAL HEALTH);INDEPENDENT
DRESS: SCRUBS (BEHAVIORAL HEALTH)
LAUNDRY: UNABLE TO COMPLETE
ORAL_HYGIENE: INDEPENDENT

## 2018-06-08 NOTE — PLAN OF CARE
Problem: Patient Care Overview  Goal: Team Discussion  Team Plan:   BEHAVIORAL TEAM DISCUSSION    Participants: Amaya Puri NP, Juliet Zayas NP, Radha Roman NP, Tangela Shafer LSW, Chayo Ness [facility], Mallory Barros SW intern, Kimmie Foster RN, Temi Ta OT, Juliet Mccarthy OT  Progress: none, delusional, moved to MHICU  Continued Stay Criteria/Rationale: delusional, bizarre behaviors, continue to stabilize on medications  Medical/Physical: hypothyroid  Precautions:   Behavioral Orders   Procedures     Code 1 - Restrict to Unit     Routine Programming     As clinically indicated     Status 15     Every 15 minutes.     Plan: possibly file for commitment, continue to stabilize on medications  Rationale for change in precautions or plan: None

## 2018-06-08 NOTE — PROGRESS NOTES
"[facility]  Psychiatric Progress Note      Impression:   This is a 42 year old yo female with a reported history of schizoaffective disorder.    Kaylee is resting in bed when I see her today. She was reportedly moved to the [facility] this morning after exhibiting bizarre behavior. She was yelling, singing songs, and barking like a dog. She was speaking in a different language and then stated \"Carlos A Cervantes hates my tits\". She told nursing staff that she has a chip implanted in the right side of her mouth which is making her ill. She did willingly take PRN Haldol. I spoke with her this afternoon and she seems much clearer. She is able to have a more linear conversation and answers my questions appropriately. She is able to provide more information today. She reports that she was evicted from her apartment on March 30th and has been living \"in the woods and in a tent\" since that time. Moved back to [place] from [place] in 2012. She states that she does not want to live in a tent anymore and has someone in [facility] working on finding her a place to stay. She denies that she is hearing any voices at this time. She states that she stopped taking her medications after she was unable to get in touch with her prescriber. She notes that she would call the office but would have to leave a voice mail. When they went to call her back she notes that her phone often was not working, so she would not receive any calls back. She does report a desire to find stable housing, find a new medication prescriber, and take her medications. Will increase her Risperdal tonight, past dosing had reportedly been 4 mg BID. Will assess over the weekend. If she continues to decompensate or starts to refuse medications consideration may need to be given to filing a petition for commitment.    Educated regarding medication indications, risks, benefits, side effects, contraindications and possible interactions. Verbally expressed " understanding.        DIagnoses:   Schizoaffective disorder-depressed type (by history)      Attestation:  Patient has been seen and evaluated by me,  Radha Roman NP          Interim History:   The patient's care was discussed with the treatment team and chart notes were reviewed.          Medications:     Current Facility-Administered Medications Ordered in Epic   Medication Dose Route Frequency Last Rate Last Dose     acetaminophen (TYLENOL) tablet 650 mg  650 mg Oral Q4H PRN         alum & mag hydroxide-simethicone (MYLANTA ES/MAALOX  ES) suspension 30 mL  30 mL Oral Q4H PRN         bisacodyl (DULCOLAX) Suppository 10 mg  10 mg Rectal Daily PRN         haloperidol (HALDOL) tablet 10 mg  10 mg Oral Q6H PRN   10 mg at 06/08/18 0829    Or     haloperidol lactate (HALDOL) injection 10 mg  10 mg Intramuscular Q6H PRN         hydrOXYzine (ATARAX) tablet 25-50 mg  25-50 mg Oral Q4H PRN   50 mg at 06/08/18 0816     levothyroxine (SYNTHROID/LEVOTHROID) tablet 50 mcg  50 mcg Oral QAM AC   50 mcg at 06/08/18 0620     magnesium hydroxide (MILK OF MAGNESIA) suspension 30 mL  30 mL Oral At Bedtime PRN         nicotine polacrilex (NICORETTE) gum 2-4 mg  2-4 mg Buccal Q1H PRN         risperiDONE (risperDAL) tablet 2 mg  2 mg Oral At Bedtime         No current [facility]-ordered outpatient prescriptions on file.          10 point ROS reviewed- denies concerns today       Allergies:   No Known Allergies         Psychiatric Examination:   /97  Pulse 79  Temp 97.7  F (36.5  C) (Tympanic)  Resp 14  Wt 129.2 kg (284 lb 14.4 oz)  SpO2 96%  Weight is 284 lbs 14.41 oz  There is no height or weight on file to calculate BMI.    Appearance:  awake, alert, adequately groomed, dressed in hospital scrubs and appeared as age stated  Attitude:  cooperative when speaking with me, has been irritable at times  Eye Contact:  good  Mood: labile  Affect:  mood congruent  Speech:  clear, coherent  Psychomotor Behavior:  no evidence of tardive  dyskinesia, dystonia, or tics  Thought Process:  She is able to have a mostly linear conversation with me after receiving Haldol  Associations:  no loose associations  Thought Content:  no evidence of suicidal ideation or homicidal ideation, denies any hallucinations though does appear somewhat preoccupied at times  Insight:  limited  Judgment:  limited  Oriented to:  time, person, and place  Attention Span and Concentration:  fair  Recent and Remote Memory:  fair  Fund of Knowledge: appropriate  Muscle Strength and Tone: normal  Gait and Station: Normal           Labs:   No results found for this or any previous visit (from the past 24 hour(s)).           Plan:   Increase Risperdal to 2 mg at bedtime, will increase further as tolerated (previous dosing was 4 mg BID)  Will assess over weekend, if no improvement in functioning or any further decompensation will likely file petition for commitment on Mon    ELOS: 4-5 days, restart Risperdal and monitor ongoing psychosis

## 2018-06-08 NOTE — PLAN OF CARE
"Problem: Patient Care Overview  Goal: Individualization & Mutuality  Patient will attend 50% of groups shiftily throughout hospital stay.  Patient will complete all ADL's without prompting by discharge of hospitalization.    Outcome: Declining  Pt is in her room laying in bed yelling out statements that nurse is unable to understand, when nurse enters the patients room she is laying in bed, she does not sit up she begins to speak in another language to nurse. Nurse does inform her that I only speak english and I can not understand what she is saying. Pt begins to sing songs loudly in english a song in the tone of 'Grandma got ran over by a rain deer' but with other words that are about bad things happening in this  World, she then states \"Carlos A Cervantes hates my tits.\" then points to her right side of her mouth and states \"they put a chip\" then runs her hand over her face and states \"made me ill\" then begins making other statements in another language. She would look to the right and states, \"and what are you looking at, why do you need to sit here and listen to this.\" Nurse does leave patient to see if she calms, pt begins to yell out again. Nurse does return to patients room when she begins to bark at nurse and speak in another language to me. Nurse asks patient to come with me to move to another room which I was planing to move her to the MH-ICU due to her yelling out and unpredictable behavior. Pt declines to come with nurse and ask for security.   0810 code green called.   0812 pt did follow security to the MH-ICU. Pt begins to state \"get this girl away from me, I do not like her she is a bad person.\" pt keeps eye contact with nurse. Nurse does leave the room.   0816 Atarax 50 mg administered p.o.   0817 Radha VALENTINO NP orders Haldol PRN  0829 Nurse administers 10 mg Haldol p.o.   While in the room with patient she looks to the unit assistant and states \"I said I don't want to see her again.... I am sorry but I " "don't fucking like that lady\"  Pt appears to be sleeping.  Pt does wake to eat lunch but continues to sleep  Pt does wake and asks to take a shower which she does.   PT is currently laying in bed, she does not have continued behaviors. She appears calm and cooperative.        Problem: Thought Process Alteration (Adult)  Goal: Improved Thought Process  Patient will have a reality based conversations.  Patient will report a decrease in hallucinations.  Patient will follow treatment teams recommendations.    Outcome: Improving  Pt does not have reality based conversations.      "

## 2018-06-08 NOTE — PLAN OF CARE
Face to face end of shift report received from Rebecca Sams. Rounding completed. Patient observed sleeping.    Kenyatta Martin  6/8/2018  4:49 PM
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
## 2018-06-08 NOTE — PLAN OF CARE
Face to face end of shift report received from Mario MANCILLA RN. Rounding completed. Patient observed in room laying in bed appears to be sleeping.     Rebecca Mayes  6/8/2018  8:13 AM

## 2018-06-08 NOTE — PLAN OF CARE
Face to face end of shift report received from pm RN. Rounding completed. Patient observed.     Mario Torres  6/7/2018  11:48 PM

## 2018-06-09 PROCEDURE — 25000132 ZZH RX MED GY IP 250 OP 250 PS 637: Performed by: NURSE PRACTITIONER

## 2018-06-09 PROCEDURE — 12400000 ZZH R&B MH

## 2018-06-09 RX ORDER — RISPERIDONE 0.5 MG/1
0.5 TABLET ORAL DAILY
Status: DISCONTINUED | OUTPATIENT
Start: 2018-06-10 | End: 2018-06-11 | Stop reason: HOSPADM

## 2018-06-09 RX ADMIN — LEVOTHYROXINE SODIUM 50 MCG: 25 TABLET ORAL at 06:37

## 2018-06-09 RX ADMIN — RISPERIDONE 2 MG: 1 TABLET ORAL at 20:24

## 2018-06-09 ASSESSMENT — ACTIVITIES OF DAILY LIVING (ADL)
GROOMING: INDEPENDENT
DRESS: SCRUBS (BEHAVIORAL HEALTH)
DRESS: SCRUBS (BEHAVIORAL HEALTH)
ORAL_HYGIENE: INDEPENDENT
ORAL_HYGIENE: INDEPENDENT
GROOMING: INDEPENDENT

## 2018-06-09 NOTE — PLAN OF CARE
Problem: Patient Care Overview  Goal: Individualization & Mutuality  Patient will attend 50% of groups shiftily throughout hospital stay.  Patient will complete all ADL's without prompting by discharge of hospitalization.    Patient will wean to open unit per treatment team recommendation.  Outcome: Therapy, progress toward functional goals is gradual  Patient has isolated in room. Did not want to sign any release of information at this time. States she is doing fine. States she feels a little drowsy. Stated she did have a hallucination in that she felt when looking at a few staff people they looked like someone else. States she is looking for housing in [facility] in [place]. Also worried about her dog. Did take risperdal with no difficulty. Mother had called unit earlier. Patient had no release so mother just gave information. States she feels patient has not taken medication for months. Stated she had left and went to [place] where she lost her car and money within a week. Made it back home and stayed briefly with her family but then left.  Patient states she feels she is doing better since starting back on risperdal. Has remained calm and is cooperative with assessment questions.    Problem: Thought Process Alteration (Adult)  Goal: Improved Thought Process  Patient will have a reality based conversations.  Patient will report a decrease in hallucinations.  Patient will follow treatment teams recommendations.    Outcome: Therapy, progress toward functional goals is gradual  Patient admitted to having brief hallucination in that staff looked like someone else. Is having a brief reality based conversation.

## 2018-06-09 NOTE — PLAN OF CARE
Face to face end of shift report received from Elena Javier completed. Patient observed sleeping in left side lying position with regular and unlabored respirations.     Kimmie Pandey  6/9/2018  12:32 AM

## 2018-06-09 NOTE — PLAN OF CARE
Face to face end of shift report received from Kimmie JEROME RN. Rounding completed. Patient observed.     Kimmie Foster  6/9/2018  7:55 AM

## 2018-06-09 NOTE — PLAN OF CARE
Problem: Patient Care Overview  Goal: Individualization & Mutuality  Patient will attend 50% of groups shiftily throughout hospital stay.  Patient will complete all ADL's without prompting by discharge of hospitalization.    Patient may wean to Group Therapy, pt. Must exhibit appropriate behaviors on open unit, be cooperative with returning to MHICU when asked by staff.   Outcome: Therapy, progress toward functional goals is gradual  Pt. Has been in bed all shift, cooperative with assessment, has been independent with ADL's, appetite good, will continue to monitor.    Problem: Thought Process Alteration (Adult)  Goal: Improved Thought Process  Patient will have a reality based conversations.  Patient will report a decrease in hallucinations.  Patient will follow treatment teams recommendations.    Outcome: Therapy, progress toward functional goals is gradual  Pt. Had reality based conversation, pt. Reports a decrease in hallucinations, is willing to follow treatment team recommendations, will continue to monitor.

## 2018-06-09 NOTE — PLAN OF CARE
Problem: Patient Care Overview  Goal: Individualization & Mutuality  Patient will attend 50% of groups shiftily throughout hospital stay.  Patient will complete all ADL's without prompting by discharge of hospitalization.    Patient will wean to open unit per treatment team recommendation.   Pt has been in bed with eyes closed and regular respirations. 15 minute and PRN checks all night. No complaints offered. Will continue to monitor.      Kimmie Pandey  6/9/2018  2:13 AM

## 2018-06-09 NOTE — PLAN OF CARE
"Problem: Patient Care Overview  Goal: Individualization & Mutuality  Patient will attend 50% of groups shiftily throughout hospital stay.  Patient will complete all ADL's without prompting by discharge of hospitalization.    Patient may wean to Group Therapy, pt. Must exhibit appropriate behaviors on open unit, be cooperative with returning to MHICU when asked by staff.    Outcome: Improving  Patient states she did start hearing voices after lunch today. Did not want any prn medication at this time. Has remained calm and cooperative with no outbursts. Did wean to group and asked to return to her room on own. Did make phone call and left message . States did have a headache this morning which \"felt like a thick liquid in her head\" but has no headache at this time. States she felt it was from\"over sleeping.\" Reported that she did shower on day shift.     Problem: Thought Process Alteration (Adult)  Goal: Improved Thought Process  Patient will have a reality based conversations.  Patient will report a decrease in hallucinations.  Patient will follow treatment teams recommendations.    Outcome: Therapy, progress toward functional goals is gradual  Patient is able to have a brief reality based conversation. Does still have hallucinations. Is following treatment team recommendations.      "

## 2018-06-09 NOTE — PLAN OF CARE
Face to face end of shift report received from Kimmie SANFORD. Rounding completed. Patient observed in group.    Kenyatta Martin  6/9/2018  5:13 PM

## 2018-06-09 NOTE — PROGRESS NOTES
"[facility]  Psychiatric Progress Note      Impression:   This is a 42 year old yo female with a reported history of schizoaffective disorder.    Notably improved when we speak this morning. She still has some mildly disorganized thoughts, but behaviors remain appropriate and she answers all questions appropriately. Indicates previous discontinuation of Risperdal about a year ago because they switched the brand and it \"didn't work.\" She was unable to get in to her prescriber. She would like to be set up for outpatient services as she does feel she needs help. Denies s/e with increase in Risperdal. Is unsure why she needed such a high dose in the past but appears to be responding well thus far, even at low dosing.          DIagnoses:   Schizoaffective disorder-depressed type (by history)      Attestation:  Patient has been seen and evaluated by me,  Andrea Kowalski NP          Interim History:   The patient's care was discussed with the treatment team and chart notes were reviewed.          Medications:     Current Facility-Administered Medications Ordered in Epic   Medication Dose Route Frequency Last Rate Last Dose     acetaminophen (TYLENOL) tablet 650 mg  650 mg Oral Q4H PRN         alum & mag hydroxide-simethicone (MYLANTA ES/MAALOX  ES) suspension 30 mL  30 mL Oral Q4H PRN         bisacodyl (DULCOLAX) Suppository 10 mg  10 mg Rectal Daily PRN         haloperidol (HALDOL) tablet 10 mg  10 mg Oral Q6H PRN   10 mg at 06/08/18 0829    Or     haloperidol lactate (HALDOL) injection 10 mg  10 mg Intramuscular Q6H PRN         hydrOXYzine (ATARAX) tablet 25-50 mg  25-50 mg Oral Q4H PRN   50 mg at 06/08/18 0816     levothyroxine (SYNTHROID/LEVOTHROID) tablet 50 mcg  50 mcg Oral QAM AC   50 mcg at 06/08/18 0620     magnesium hydroxide (MILK OF MAGNESIA) suspension 30 mL  30 mL Oral At Bedtime PRN         nicotine polacrilex (NICORETTE) gum 2-4 mg  2-4 mg Buccal Q1H PRN         risperiDONE (risperDAL) tablet " 2 mg  2 mg Oral At Bedtime         No current [facility]-ordered outpatient prescriptions on file.          10 point ROS reviewed- denies concerns today       Allergies:   No Known Allergies         Psychiatric Examination:   /84  Pulse 85  Temp 97.4  F (36.3  C) (Tympanic)  Resp 14  Wt 129.2 kg (284 lb 14.4 oz)  SpO2 94%  Weight is 284 lbs 14.41 oz  There is no height or weight on file to calculate BMI.    Appearance:  awake, alert  Attitude:  cooperative  Eye Contact:  good  Mood:  better  Affect:  mood congruent  Speech:  clear, coherent  Psychomotor Behavior:  no evidence of tardive dyskinesia, dystonia, or tics  Thought Process:  logical and goal oriented; mild presentation of disorganized thoughts.  Associations:  no loose associations  Thought Content:  no evidence of suicidal ideation or homicidal ideation  Insight:  fair  Judgment:  fair  Oriented to:  time, person, and place  Attention Span and Concentration:  intact  Recent and Remote Memory:  fair  Fund of Knowledge: appropriate  Muscle Strength and Tone: normal  Gait and Station: Normal             Labs:   No labs today.         Plan:   Continue Risperdal at 2 mg at bedtime.  Start 0.5mg tomorrow AM.   Will continue to assess over weekend, if no improvement in functioning or any further decompensation will likely file petition for commitment on Mon    ELOS: 4-5 days, restart Risperdal and monitor ongoing psychosis

## 2018-06-10 PROCEDURE — 25000132 ZZH RX MED GY IP 250 OP 250 PS 637: Performed by: NURSE PRACTITIONER

## 2018-06-10 PROCEDURE — 12400000 ZZH R&B MH

## 2018-06-10 PROCEDURE — 99232 SBSQ HOSP IP/OBS MODERATE 35: CPT | Performed by: NURSE PRACTITIONER

## 2018-06-10 RX ORDER — EMOLLIENT BASE
CREAM (GRAM) TOPICAL EVERY 6 HOURS PRN
Status: DISCONTINUED | OUTPATIENT
Start: 2018-06-10 | End: 2018-06-11 | Stop reason: HOSPADM

## 2018-06-10 RX ADMIN — RISPERIDONE 0.5 MG: 0.5 TABLET ORAL at 08:01

## 2018-06-10 RX ADMIN — LEVOTHYROXINE SODIUM 50 MCG: 25 TABLET ORAL at 05:53

## 2018-06-10 RX ADMIN — Medication: at 12:38

## 2018-06-10 RX ADMIN — RISPERIDONE 2 MG: 1 TABLET ORAL at 20:07

## 2018-06-10 ASSESSMENT — ACTIVITIES OF DAILY LIVING (ADL)
DRESS: SCRUBS (BEHAVIORAL HEALTH);INDEPENDENT
ORAL_HYGIENE: INDEPENDENT
GROOMING: INDEPENDENT
LAUNDRY: UNABLE TO COMPLETE
DRESS: SCRUBS (BEHAVIORAL HEALTH)
GROOMING: INDEPENDENT
ORAL_HYGIENE: INDEPENDENT

## 2018-06-10 NOTE — PLAN OF CARE
"Problem: Patient Care Overview  Goal: Individualization & Mutuality  Patient will attend 50% of groups shiftily throughout hospital stay.  Patient will complete all ADL's without prompting by discharge of hospitalization.    6/10/18: Patient may wean to the open unit as long as behavior remains appropriate. Pt will be agreeable to contraband search prior to reentering the MHICU. Treatment team will reevaluate daily.   Patient is calm and cooperative with this writer during nursing assessment and other interactions. Pt presents with full range affect. She states she is feeling \"good\" today. Pt denies having suicidal ideation, homicidal ideation, anxiety, or depression. Pt does endorse having some auditory hallucinations but states they are not bothersome. Pt did request a shower this morning. She stated she felt even better after cleaning up. Pt weaned out to the open unit to journal. Pt withdrawn and does not converse much with staff or peers while out on the open unit. After lunch, patient requested to lay down after breakfast. Pt did have complaints of dry, itchy skin to forearms bilaterally. Arms appears to have what looks like a few bug bites but is mostly dry in appearance. No open areas noted. Did update provider, Juliet. Lucius ordered and applied. Pt currently in bed resting. Has no questions regarding plan of care at this time.     Problem: Thought Process Alteration (Adult)  Goal: Improved Thought Process  Patient will have a reality based conversations.  Patient will report a decrease in hallucinations.  Patient will follow treatment teams recommendations.    Pt able to have reality based conversation. Pt continues to have auditory hallucinations.       "

## 2018-06-10 NOTE — PLAN OF CARE
Face to face end of shift report received from Candice SANFORD. Rounding completed. Patient observed in lounge area.    Kenyatta Martin  6/10/2018  4:16 PM

## 2018-06-10 NOTE — PLAN OF CARE
Face to face end of shift report received from Elena Javier completed. Patient observed sleeping in left side lying position with regular and unlabored respirations.     Kimmie Pandey  6/10/2018  12:24 AM

## 2018-06-10 NOTE — PLAN OF CARE
Face to face end of shift report received from Kimmie Pandey RN. Rounding completed. Patient observed.     Candice Stover  6/10/2018  7:48 AM

## 2018-06-10 NOTE — PROGRESS NOTES
"[facility]  Psychiatric Progress Note      Impression:   This is a 42 year old yo female with a reported history of schizoaffective disorder.    Today sitting at table in open area, and pleasant and cooperative. Stated the Vanicream ordered this am is helping the dryness and pruitis. Depression 2/10 and anxiety 4/10 and no S/I. Sleeping and eating good.  Minimal disorganized thoughts, but behaviors remain appropriate and she answers all questions appropriately. Indicates previous discontinuation of Risperdal about a year ago because they switched the brand and it \"didn't work.\" She was unable to get in to her prescriber. She would like to be set up for outpatient services as she does feel she needs help. Stated sjananda has started to go to groups and they are helpful. Denies s/e with increase in Risperdal. Stated she is looking to go to [facility] in [place] and encouraged her to talk to SW in am. No evidence of psychoses.          DIagnoses:   Schizoaffective disorder-depressed type (by history)      Attestation:  Patient has been seen and evaluated by me,  Tanmay Daniels NP          Interim History:   The patient's care was discussed with the treatment team and chart notes were reviewed.          Medications:     Current Facility-Administered Medications Ordered in Epic   Medication Dose Route Frequency Last Rate Last Dose     acetaminophen (TYLENOL) tablet 650 mg  650 mg Oral Q4H PRN         alum & mag hydroxide-simethicone (MYLANTA ES/MAALOX  ES) suspension 30 mL  30 mL Oral Q4H PRN         bisacodyl (DULCOLAX) Suppository 10 mg  10 mg Rectal Daily PRN         haloperidol (HALDOL) tablet 10 mg  10 mg Oral Q6H PRN   10 mg at 06/08/18 0829    Or     haloperidol lactate (HALDOL) injection 10 mg  10 mg Intramuscular Q6H PRN         hydrOXYzine (ATARAX) tablet 25-50 mg  25-50 mg Oral Q4H PRN   50 mg at 06/08/18 0816     levothyroxine (SYNTHROID/LEVOTHROID) tablet 50 mcg  50 mcg Oral QAM AC   50 mcg at " 06/08/18 0620     magnesium hydroxide (MILK OF MAGNESIA) suspension 30 mL  30 mL Oral At Bedtime PRN         nicotine polacrilex (NICORETTE) gum 2-4 mg  2-4 mg Buccal Q1H PRN         risperiDONE (risperDAL) tablet 2 mg  2 mg Oral At Bedtime         No current Epic-ordered outpatient prescriptions on file.      10 point ROS reviewed- denies concerns today       Allergies:   No Known Allergies         Psychiatric Examination:   /88  Pulse 90  Temp 97.8  F (36.6  C) (Tympanic)  Resp 14  Wt 129.9 kg (286 lb 6.4 oz)  SpO2 94%  Weight is 286 lbs 6.4 oz  There is no height or weight on file to calculate BMI.    Appearance:  awake, alert  Attitude:  cooperative  Eye Contact:  good  Mood:  better and minimal depression and anxiety  Affect:  mood congruent  Speech:  clear, coherent  Psychomotor Behavior:  no evidence of tardive dyskinesia, dystonia, or tics  Thought Process:  logical and goal oriented; mild presentation of disorganized thoughts.  Associations:  no loose associations  Thought Content:  no evidence of suicidal ideation or homicidal ideation  Insight:  fair  Judgment:  fair  Oriented to:  time, person, and place  Attention Span and Concentration:  intact  Recent and Remote Memory:  fair  Fund of Knowledge: appropriate  Muscle Strength and Tone: normal  Gait and Station: Normal         Labs:   No labs today.         Plan:   Continue Risperdal at 2 mg at bedtime.  Start 0.5mg tomorrow AM.   Will continue to assess over weekend, if no improvement in functioning or any further decompensation will likely file petition for commitment on Mon    ELOS: 4-5 days, restart Risperdal and monitor ongoing psychosis

## 2018-06-10 NOTE — PLAN OF CARE
Problem: Patient Care Overview  Goal: Individualization & Mutuality  Patient will attend 50% of groups shiftily throughout hospital stay.  Patient will complete all ADL's without prompting by discharge of hospitalization.    Patient may wean to Group Therapy, pt. Must exhibit appropriate behaviors on open unit, be cooperative with returning to MHICU when asked by staff.    Pt has been in bed with eyes closed and regular respirations. 15 minute and PRN checks all night. No complaints offered. Will continue to monitor.    Per [facility]A pt had c/o of ear pain and requested provider look into this in the am.      Kimmie Pandey  6/10/2018  1:47 AM

## 2018-06-11 VITALS
OXYGEN SATURATION: 97 % | DIASTOLIC BLOOD PRESSURE: 68 MMHG | HEART RATE: 84 BPM | RESPIRATION RATE: 14 BRPM | TEMPERATURE: 98 F | WEIGHT: 286.4 LBS | SYSTOLIC BLOOD PRESSURE: 147 MMHG

## 2018-06-11 PROCEDURE — 25000132 ZZH RX MED GY IP 250 OP 250 PS 637: Performed by: NURSE PRACTITIONER

## 2018-06-11 PROCEDURE — 99239 HOSP IP/OBS DSCHRG MGMT >30: CPT | Performed by: NURSE PRACTITIONER

## 2018-06-11 RX ORDER — EMOLLIENT BASE
CREAM (GRAM) TOPICAL EVERY 6 HOURS PRN
COMMUNITY
Start: 2018-06-11

## 2018-06-11 RX ORDER — LEVOTHYROXINE SODIUM 50 UG/1
50 TABLET ORAL DAILY
Qty: 30 TABLET | Refills: 0 | Status: SHIPPED | OUTPATIENT
Start: 2018-06-11

## 2018-06-11 RX ORDER — RISPERIDONE 0.5 MG/1
0.5 TABLET ORAL DAILY
Qty: 30 TABLET | Refills: 0 | Status: SHIPPED | OUTPATIENT
Start: 2018-06-12

## 2018-06-11 RX ORDER — RISPERIDONE 2 MG/1
2 TABLET ORAL AT BEDTIME
Qty: 30 TABLET | Refills: 0 | Status: SHIPPED | OUTPATIENT
Start: 2018-06-11

## 2018-06-11 RX ADMIN — LEVOTHYROXINE SODIUM 50 MCG: 25 TABLET ORAL at 05:48

## 2018-06-11 RX ADMIN — RISPERIDONE 0.5 MG: 0.5 TABLET ORAL at 08:50

## 2018-06-11 RX ADMIN — HYDROXYZINE HYDROCHLORIDE 50 MG: 25 TABLET ORAL at 01:34

## 2018-06-11 ASSESSMENT — ACTIVITIES OF DAILY LIVING (ADL)
DRESS: SCRUBS (BEHAVIORAL HEALTH);INDEPENDENT
GROOMING: INDEPENDENT
ORAL_HYGIENE: INDEPENDENT
LAUNDRY: UNABLE TO COMPLETE

## 2018-06-11 NOTE — DISCHARGE INSTRUCTIONS
Behavioral Discharge Planning and Instructions    Summary: Kaylee was admitted to [address] [place] [address] for psychosis    Main Diagnosis:  Schizoaffective disorder, depressed type    Major Treatments, Procedures and Findings: Stabilize with medications, connect with community programs.    Symptoms to Report: feeling more aggressive, increased confusion, losing more sleep, mood getting worse or thoughts of suicide    Lifestyle Adjustment: Take all medications as prescribed, meet with doctor/ medication provider, out patient therapist, , and [facility]. Abstain from alcohol or any unprescribed drugs.      Psychiatry Follow-up:     [facility]: Elena Mustafa: Wed. June 13th @ 10:40 am  Therapy: Stef Wisdom: call to reschedule  [address] [place] [id number] [phone]  [phone]    [facility] :  Referral sent for Case Management and [facility] 6/11/18  [facility]  [address] [place] [address]  Phone [phone]   Fax- [phone]    [place], [place]   Phone: [phone]    Resources:   Crisis Intervention: 110.411.9093 or 136-858-7079 (TTY: 899.672.6746).  Call anytime for help.  National [place] on Mental Illness (www.[place].tanya.org): 765.698.8322 or 967-982-9179.  Alcoholics Anonymous (www.alcoholics-anonymous.org): Check your phone book for your local chapter.  Suicide Awareness Voices of Education (SAVE) (www.save.org): 744-124-PFAN (3408)  National Suicide Prevention Line (www.mentalhealthmn.org): 940-599-ZPDE (6375)  Mental Health Consumer/Survivor Network of [place] (www.mhcsn.net): 706.983.3876 or 782-102-2911  Mental Health Association of [place] (www.mentalhealth.org): 895.655.8947 or 379-646-9913    General Medication Instructions:   See your medication sheet(s) for instructions.   Take all medicines as directed.  Make no changes unless your doctor suggests them.   Go to all your doctor visits.  Be sure to have all your required lab  "tests. This way, your medicines can be refilled on time.  Do not use any drugs not prescribed by your doctor.  Avoid alcohol.    [place] Area:  [facility], Crisis stabilization [facility]- [phone]  On license of [facility] Crisis Line: 1-117.591.6612  Advocates For Family Peace: 071-2782  Sexual Assault Program of [facility]: 255.268.4369 or 1-340.736.7246  [place] Battered Women's Program: 1-668.387.8682 Ext: 279       Calls answered Mon-Fri-8:00 am--4:30 pm    [place]:  Advocates for Family Peace: 1-631.806.5463  [facility] first call for help: 1-307.891.3264  [facility]:  (639) 470-8103      [place] Area:  Warm Line: 1-260.279.7189       Calls answered Tuesday--Saturday 4:00 pm--10:00 pm  [facility] Crisis Line - 291.827.8271  [facility] 932-617-6812    [place] Statewide:  [place] Crisis and Referral Services: 1-300.929.9961  National Suicide Prevention Lifeline: 0-450-033-TALK (2982)   - qew7oclz- Text \"Life\" to 94009  First Call for Help: 2-1-1  [facility] Helpline- 3-638-ALSC-HELP        "

## 2018-06-11 NOTE — PLAN OF CARE
"Problem: Patient Care Overview  Goal: Individualization & Mutuality  Patient will attend 50% of groups shiftily throughout hospital stay.  Patient will complete all ADL's without prompting by discharge of hospitalization.    6/10/18: Patient may wean to the open unit as long as behavior remains appropriate. Pt will be agreeable to contraband search prior to reentering the MHICU. Treatment team will reevaluate daily.    Outcome: Improving  Patient in lounge area working on art work. States she is doing much better. Denies hallucinations. States,\" I haven't had any today.\" Admits to feeling better. States she thinks the medications are working. Is attending groups and has no outbursts. Is compliant with ADL's. Took shower on day shift.    Problem: Thought Process Alteration (Adult)  Goal: Improved Thought Process  Patient will have a reality based conversations.  Patient will report a decrease in hallucinations.  Patient will follow treatment teams recommendations.    Outcome: Improving  Patient is maintaining a reality based conversation. Has decreased hallucinations. Is agreeable to treatment team recommendations.      "

## 2018-06-11 NOTE — PLAN OF CARE
Problem: Patient Care Overview  Goal: Discharge Needs Assessment  Writer spoke with pt who stated she had been talking with [facility] in [place] about coming to stay there and asked writer to call them- she signed a CHARLES and writer called and spoke with Kaycee there who stated they can take the pt if she can come in within 24 hours and they can also take her service dog.  Writer told the pt and she was going to call her parents to see if they could provide transportation to [place].    Pt stated she spoke with pt who stated he parents can come  pt and take her down to [facility].  Discussed her upcoming appointments and getting her set up with case management and [facility] services: will send referral for this.     Pt is discharging at the recommendation of the treatment team. [facility] by her parents. Pt denies having any thoughts of hurting themself or anyone else. Pt denies anxiety or depression. Pt has follow up with Elena Mustafa. Discharge instructions, including; demographic sheet, psychiatric evaluation, discharge summary, and AVS were faxed to these next level of care providers.

## 2018-06-11 NOTE — PLAN OF CARE
"Social Service Psychosocial Assessment  Presenting Problem:   Kaylee is a 42 year-old, ,  female with a history of schizophrenia who was brought into the [facility] by PD for psychosis.  According to admission notes, \"PD states that she called 911 for a sunburn however PD brought her in for a mental health check.  Pt denies thoughts of self-harm or hurting others.  Denies drug or alcohol use.  States that she does not know what's going on outside and it effects the halo around her head.  Last took her risperdal 1 year ago. Pt does hear voices of people chatting- does not make sense.  Voices do not tell her to do anything. Pt is homeless and living in a tent at a campground in [place].\"   Marital Status: : was  for 15 years- he was from [place] [facility].  They met in college.    Spouse / Children:  Has 2 sons.  Pt reports she lived in [place] for 15 years while  and her two sons still live there.  She keeps in touch with her sons via Internet.   Psychiatric TX HX:  Pt has a history of schizophrenia but has been off all medications for over a year.  Pt reports spending a month in the hospital in [place] [facility] in 2004 and also has been at  [facility] for a week or so in the past.   Suicide Risk Assessment: On admission pt denied SI.  Pt continues to deny any SI today.  Pt denies any history of suicide attempts.   Access to Lethal Means (explain):  Pt denies access to firearms.   Family Psych HX:  Pt denies any knowledge of mental health issues or suicides in the family.   A & Ox:  3  Medication Adherence:  Unknown  Medical Issues: See H & P  Visual  Motor Functioning: Okay  Communication Skills /Needs:  Okay  Ethnicity:   White     Spirituality/Christianity Affiliation:  Presybeterian   Clergy Request:   No   History: None  Living Situation:  Pt has been homeless for a year- states she was living on the streets in [place] and has been staying in a tent at a campground in [place] since " "May.  Pt plans to go the [facility] shelter in [place].   ADL s: Independent  Education: Graduated high school. Pt reports she has a BA in international relations from [employer].   Financial Situation: Pt states she is broke. Is on SSI  Occupation: Disabled.  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   Leisure & Recreation: Enjoys writing, listening to music and being out in nature.  Childhood History:  Pt grew up in [place] with her parents and 1 older brother.  Pt reports having a \"normal\" childhood.   Trauma Abuse HX:  Denies any childhood abuse or trauma  Relationship / Sexuality:  Heterosexual.  Not currently in a relationship.  Substance Use/ Abuse:  Denies drug or alcohol abuse.  On admission Utox was negative.   Chemical Dependency Treatment HX:  denies  Legal Issues:  Denies  Significant Life Events: Unknown  Strengths: Has family support and willing to accept services  Challenges /Limitation:  Lack of resources, mental health symptoms.  Patient Support Contact (Include name, relationship, number, and summary of conversation):  Refused to sign CHARLES  Interventions:       Community-Based Programs: Referral for Counts include [facility] beds at the [facility]    Medication Management: Elena Mustafa: Indu ALVES    Individual Therapy: Indu August    Case Management: Referral for     Insurance Coverage: MA pending    Suicide Risk Assessment : On admission pt denied SI.  Pt continues to deny any SI today.  Pt denies any history of suicide attempts.     High Risk Safety Plan Talk to supports; Call crisis lines; Go to local ER if feeling suicidal.        "

## 2018-06-11 NOTE — PLAN OF CARE
Problem: Patient Care Overview  Goal: Individualization & Mutuality  Patient will attend 50% of groups shiftily throughout hospital stay.  Patient will complete all ADL's without prompting by discharge of hospitalization.    6/10/18: Patient may wean to the open unit as long as behavior remains appropriate. Pt will be agreeable to contraband search prior to reentering the MHICU. Treatment team will reevaluate daily.    Pt up and writing in her notebook from 2300-    Pt has been in bed with eyes closed and regular respirations. 15 minute and PRN checks all night. No complaints offered. Will continue to monitor.    0132- Pt requested and was administered 50 mg atarax due c/o of increasing anxiety from drinking too much coffee and not being able to sleep.     Kimmie Pandey  6/11/2018  1:22 AM

## 2018-06-11 NOTE — DISCHARGE SUMMARY
"Psychiatric Discharge Summary    Kaylee Hester MRN# [id number]   Age: 42 year old YOB: 1975     Date of Admission:  6/7/2018  Date of Discharge:  6/11/2018  Admitting Physician:  Amaya Puri NP  Discharge Physician:  Radha Roman CNP         Event Leading to Hospitalization and Hospital Stay   Admission: Kaylee Hester is a 42 year old   female who was brought to the ED by police for mental health assessment after calling 911 in the middle of the night for sunburn. She has been staying in a tent in [facility]. She reportedly receives food from the [employer]. In the ED she reported that she does not know what is going on outside and it affects the halo around her head. History of taking Risperdal, has been off of medications for about a year.      I attempted to meet with Kaylee today in her room, though she initially keeps her back to me and repeatedly falls asleep during questioning. She is unable to provide any meaningful information besides that she came to the hospital for \"overexposure\" and is on the mental health floor \"for monitoring\". Discussed her use of Risperdal and she states that it \"doesn't help\", though then states \"I think I need some Xanax.\" When asked if this was being prescribed to her she reports \"no naren no one will give it to me\". She then starts to become upset and sticks her middle finger up at me and rolls over mumbling something about calling her when some male leaves the unit. When asked to repeat what she had just said she then ignores me.      ED records indicate that she admitted to hearing \"voices of people chatting\", though the chatting did not make any sense. She denied any thoughts of suicidal or homicidal ideation. TSH was noted to be elevated upon admission, has not been taking Synthroid or other medications reportedly. It appears she was most recently prescribed Risperdal at [facility] in November. Will " attempt to restart this today to see if symptoms improve.     Hospital stay: Kaylee was admitted to the behavioral health unit as a voluntary patient. Risperdal was restarted as records indicated that she had been on this in the past. She was also restarted on previous dose of Synthroid as TSH was elevated at 6.58. She was initially quite disorganized and exhibited bizarre behavior that included barking like a dog at the nurses, singing and yelling loudly, and speaking in a different language. She did spend time in the MHICU due to her unpredictable behavior. Consideration was given to filing a petition for commitment due to her level of disorganization. However after Risperdal was started she began to improve. Her thought process was a lot clearer and she was able to participate in a linear conversation. She was able to attend group programming and was visible in the milieu. She denies any hallucinations or thoughts of self-harm. She reported that she had been working with someone in [place] to get into [facility] shelter.  did call and they are able to accept her if discharged today. She is much more organized today and is requesting discharge. There is no criteria currently to place her on a hold, so will discharge her with family. She does have an appointment with her psychiatric medication prescriber on Wednesday and is encouraged to keep that appointment to continue titrating up on her dose of Risperdal as needed to further target any residual symptoms.             At time of discharge, there is no evidence that patient is in immediate danger of self or others.        DIagnoses:   Schizoaffective disorder- depressed type (by history)         Labs:     Results for orders placed or performed during the hospital encounter of 06/07/18   Drug Screen Urine ([facility])   Result Value Ref [facility]    Amphetamine Qual Urine Negative NEG^Negative    Barbiturates Qual Urine Negative NEG^Negative    Benzodiazepine  Qual Urine Negative NEG^Negative    Cannabinoids Qual Urine Negative NEG^Negative    Cocaine Qual Urine Negative NEG^Negative    Opiates Qualitative Urine Negative NEG^Negative    Methadone Qual Urine Negative NEG^Negative    PCP Qual Urine Negative NEG^Negative   UA with Microscopic reflex to Culture   Result Value Ref [facility]    Color Urine Light Yellow     Appearance Urine Slightly Cloudy     Glucose Urine Negative NEG^Negative mg/dL    Bilirubin Urine Negative NEG^Negative    Ketones Urine Negative NEG^Negative mg/dL    Specific Gravity Urine 1.006 1.003 - 1.035    Blood Urine Negative NEG^Negative    pH Urine 5.5 4.7 - 8.0 pH    Protein Albumin Urine Negative NEG^Negative mg/dL    Urobilinogen mg/dL Normal 0.0 - 2.0 mg/dL    Nitrite Urine Negative NEG^Negative    Leukocyte Esterase Urine Moderate (A) NEG^Negative    Source Midstream Urine     WBC Urine 7 (H) 0 - 5 /HPF    RBC Urine 1 0 - 2 /HPF    Bacteria Urine Few (A) NEG^Negative /HPF    Squamous Epithelial /HPF Urine 5 (H) 0 - 1 /HPF    Mucous Urine Present (A) NEG^Negative /LPF   HCG qualitative urine   Result Value Ref [facility]    HCG Qual Urine Negative NEG^Negative   Acetaminophen level   Result Value Ref [facility]    Acetaminophen Level <2 (L) 10 - 20 mg/L   Alcohol ethyl   Result Value Ref [facility]    Ethanol g/dL <0.01 0.01 g/dL   CBC with platelets differential   Result Value Ref [facility]    WBC 9.3 4.0 - 11.0 10e9/L    RBC Count 5.14 3.8 - 5.2 10e12/L    Hemoglobin 13.6 11.7 - 15.7 g/dL    Hematocrit 41.1 35.0 - 47.0 %    MCV 80 78 - 100 fl    MCH 26.5 26.5 - 33.0 pg    MCHC 33.1 31.5 - 36.5 g/dL    RDW 14.6 10.0 - 15.0 %    Platelet Count 224 150 - 450 10e9/L    Diff Method Automated Method     % Neutrophils 69.5 %    % Lymphocytes 21.0 %    % Monocytes 6.0 %    % Eosinophils 2.6 %    % Basophils 0.5 %    % Immature Granulocytes 0.4 %    Nucleated RBCs 0 0 /100    Absolute Neutrophil 6.4 1.6 - 8.3 10e9/L    Absolute Lymphocytes 1.9 0.8 - 5.3 10e9/L    Absolute  Monocytes 0.6 0.0 - 1.3 10e9/L    Absolute Eosinophils 0.2 0.0 - 0.7 10e9/L    Absolute Basophils 0.1 0.0 - 0.2 10e9/L    Abs Immature Granulocytes 0.0 0 - 0.4 10e9/L    Absolute Nucleated RBC 0.0    Comprehensive metabolic panel   Result Value [facility]    Sodium 137 133 - 144 mmol/L    Potassium 3.3 (L) 3.4 - 5.3 mmol/L    Chloride 103 94 - 109 mmol/L    Carbon Dioxide 27 20 - 32 mmol/L    Anion Gap 7 3 - 14 mmol/L    Glucose 132 (H) 70 - 99 mg/dL    Urea Nitrogen 11 7 - 30 mg/dL    Creatinine 0.71 0.52 - 1.04 mg/dL    GFR Estimate 90 >60 mL/min/1.7m2    GFR Estimate If Black >90 >60 mL/min/1.7m2    Calcium 9.6 8.5 - 10.1 mg/dL    Bilirubin Total 0.5 0.2 - 1.3 mg/dL    Albumin 3.4 3.4 - 5.0 g/dL    Protein Total 7.3 6.8 - 8.8 g/dL    Alkaline Phosphatase 86 40 - 150 U/L    ALT 29 0 - 50 U/L    AST 20 0 - 45 U/L   INR   Result Value Ref [facility]    INR 1.03 0.80 - 1.20   Partial thromboplastin time   Result Value Ref [facility]    PTT 28 24.00 - 37.00 sec   Salicylate level   Result Value Ref [facility]    Salicylate Level <2 mg/dL   TSH   Result Value Ref [facility]    TSH 6.58 (H) 0.40 - 4.00 mU/L   Urine Culture Aerobic Bacterial   Result Value Ref [facility]    Specimen Description Midstream Urine     Culture Micro       >100,000 colonies/mL  mixed urogenital niurka  Identification and susceptibility to follow.              Discharge Medications:     Current Discharge Medication List      START taking these medications    Details   emollient (VANICREAM) cream Apply topically every 6 hours as needed for other (dry skin)         CONTINUE these medications which have CHANGED    Details   levothyroxine (SYNTHROID/LEVOTHROID) 50 MCG tablet Take 1 tablet (50 mcg) by mouth daily  Qty: 30 tablet, Refills: 0    Associated Diagnoses: Hypothyroidism, unspecified type      !! risperiDONE (RISPERDAL) 0.5 MG tablet Take 1 tablet (0.5 mg) by mouth daily in the morning  Qty: 30 tablet, Refills: 0    Associated Diagnoses: Chronic paranoid  schizophrenia (H)      !! risperiDONE (RISPERDAL) 2 MG tablet Take 1 tablet (2 mg) by mouth At Bedtime  Qty: 30 tablet, Refills: 0    Associated Diagnoses: Chronic paranoid schizophrenia (H)       !! - Potential duplicate medications found. Please discuss with provider.          Justification for dual anti-psychotic use: not applicable       Psychiatric Examination:   Appearance:  awake, alert, adequately groomed and appeared as age stated  Attitude:  cooperative  Eye Contact:  good  Mood:  better  Affect:  appropriate and in normal range  Speech:  clear, coherent  Psychomotor Behavior:  no evidence of tardive dyskinesia, dystonia, or tics  Thought Process:  logical, linear and goal oriented  Associations:  no loose associations  Thought Content:  no evidence of suicidal ideation or homicidal ideation and no evidence of psychotic thought  Insight:  fair  Judgment:  limited  Oriented to:  time, person, and place  Attention Span and Concentration:  fair  Recent and Remote Memory:  intact  Fund of Knowledge: appropriate  Muscle Strength and Tone: normal  Gait and Station: Normal  Perception: no perceptual disturbances noted at this time       Discharge Plan:   Psychiatry Follow-up:      [facility]: Elena Mustafa: Wed. June 13th @ 10:40 am  Therapy: Stef Wisdom: call to reschedule  [address] [place] [id number] [phone]  Ohy-[phone]     [facility] :  Referral sent for Case Management and [facility] 6/11/18  [facility] [place]  [address] [place] [id number]  Phone [phone]   Fax- [phone]     [place], [place]   Phone: [phone]      Attestation:  The patient has been seen and evaluated by me,  Radha Roman NP         Discharge Services Provided:    40 minutes spent on discharge services, including:  Final examination of patient.  Review and discussion of Hospital stay.  Instructions for continued outpatient care/goals.  Preparation of  discharge records.  Preparation of medications refills and new prescriptions.

## 2018-06-11 NOTE — PLAN OF CARE
Problem: Patient Care Overview  Goal: Individualization & Mutuality  Patient will attend 50% of groups shiftily throughout hospital stay.  Patient will complete all ADL's without prompting by discharge of hospitalization.    6/10/18: Patient may wean to the open unit as long as behavior remains appropriate. Pt will be agreeable to contraband search prior to reentering the MHICU. Treatment team will reevaluate daily.    Patient denies all criteria, she states she is doing well at this time, and feels as though she is ready to go home. Patient will be meeting with provider and hopes to go home. Patients will have her father to pick her up. Patient weans to the open unit most of the shift, and has been appropriate.     Problem: Thought Process Alteration (Adult)  Goal: Improved Thought Process  Patient will have a reality based conversations.  Patient will report a decrease in hallucinations.  Patient will follow treatment teams recommendations.    Pt reports no hallucinations, pt is able to have appropriate conversations, follows treatment team recommendations

## 2018-06-11 NOTE — PLAN OF CARE
Face to face end of shift report received from Kimmie SANFORD. Rounding completed. Patient observed.     Adilene Ahuja  6/11/2018  7:47 AM

## 2018-06-11 NOTE — PLAN OF CARE
Face to face end of shift report received from Elena Javier completed. Patient observed awake in bed writing with no complaints offered.     Kimmie Pandey  6/10/2018  11:51 PM

## 2018-06-24 ENCOUNTER — HEALTH MAINTENANCE LETTER (OUTPATIENT)
Age: 43
End: 2018-06-24

## 2018-07-30 DIAGNOSIS — F20.0 CHRONIC PARANOID SCHIZOPHRENIA (H): ICD-10-CM

## 2018-07-30 NOTE — TELEPHONE ENCOUNTER
Risperidone 0.5      Last Written Prescription Date:  Not a [facility] pt  Last Fill Quantity: ,   # refills:   Last Office Visit: none  Future Office visit:  none       risperidone      Last Written Prescription Date:  Not a [facility] pt  Last Fill Quantity: ,   # refills:   Last Office Visit: none  Future Office visit:   none

## 2018-07-31 RX ORDER — RISPERIDONE 0.5 MG/1
TABLET ORAL
Qty: 30 TABLET | Refills: 0 | OUTPATIENT
Start: 2018-07-31

## 2018-07-31 RX ORDER — RISPERIDONE 2 MG/1
TABLET ORAL
Qty: 30 TABLET | Refills: 0 | OUTPATIENT
Start: 2018-07-31

## 2019-10-04 ENCOUNTER — HEALTH MAINTENANCE LETTER (OUTPATIENT)
Age: 44
End: 2019-10-04

## 2020-11-08 ENCOUNTER — HEALTH MAINTENANCE LETTER (OUTPATIENT)
Age: 45
End: 2020-11-08

## 2021-01-30 ENCOUNTER — HEALTH MAINTENANCE LETTER (OUTPATIENT)
Age: 46
End: 2021-01-30

## 2021-09-11 ENCOUNTER — HEALTH MAINTENANCE LETTER (OUTPATIENT)
Age: 46
End: 2021-09-11

## 2022-01-01 ENCOUNTER — HEALTH MAINTENANCE LETTER (OUTPATIENT)
Age: 47
End: 2022-01-01

## 2022-02-26 ENCOUNTER — HEALTH MAINTENANCE LETTER (OUTPATIENT)
Age: 47
End: 2022-02-26

## 2022-10-29 ENCOUNTER — HEALTH MAINTENANCE LETTER (OUTPATIENT)
Age: 47
End: 2022-10-29

## 2023-04-08 ENCOUNTER — HEALTH MAINTENANCE LETTER (OUTPATIENT)
Age: 48
End: 2023-04-08